# Patient Record
Sex: FEMALE | Race: AMERICAN INDIAN OR ALASKA NATIVE
[De-identification: names, ages, dates, MRNs, and addresses within clinical notes are randomized per-mention and may not be internally consistent; named-entity substitution may affect disease eponyms.]

---

## 2019-02-18 ENCOUNTER — HOSPITAL ENCOUNTER (EMERGENCY)
Dept: HOSPITAL 5 - ED | Age: 54
Discharge: HOME | End: 2019-02-18
Payer: COMMERCIAL

## 2019-02-18 VITALS — SYSTOLIC BLOOD PRESSURE: 148 MMHG | DIASTOLIC BLOOD PRESSURE: 80 MMHG

## 2019-02-18 DIAGNOSIS — K21.9: ICD-10-CM

## 2019-02-18 DIAGNOSIS — M19.90: ICD-10-CM

## 2019-02-18 DIAGNOSIS — M79.602: Primary | ICD-10-CM

## 2019-02-18 DIAGNOSIS — I10: ICD-10-CM

## 2019-02-18 LAB
BASOPHILS # (AUTO): 0 K/MM3 (ref 0–0.1)
BASOPHILS NFR BLD AUTO: 0.8 % (ref 0–1.8)
BUN SERPL-MCNC: 10 MG/DL (ref 7–17)
BUN/CREAT SERPL: 13 %
CALCIUM SERPL-MCNC: 9.1 MG/DL (ref 8.4–10.2)
EOSINOPHIL # BLD AUTO: 0 K/MM3 (ref 0–0.4)
EOSINOPHIL NFR BLD AUTO: 0.9 % (ref 0–4.3)
HCT VFR BLD CALC: 41.2 % (ref 30.3–42.9)
HEMOLYSIS INDEX: 7
HGB BLD-MCNC: 13.8 GM/DL (ref 10.1–14.3)
LYMPHOCYTES # BLD AUTO: 1.8 K/MM3 (ref 1.2–5.4)
LYMPHOCYTES NFR BLD AUTO: 32.3 % (ref 13.4–35)
MCHC RBC AUTO-ENTMCNC: 34 % (ref 30–34)
MCV RBC AUTO: 80 FL (ref 79–97)
MONOCYTES # (AUTO): 0.5 K/MM3 (ref 0–0.8)
MONOCYTES % (AUTO): 8.7 % (ref 0–7.3)
PLATELET # BLD: 276 K/MM3 (ref 140–440)
RBC # BLD AUTO: 5.14 M/MM3 (ref 3.65–5.03)

## 2019-02-18 PROCEDURE — 84484 ASSAY OF TROPONIN QUANT: CPT

## 2019-02-18 PROCEDURE — 93005 ELECTROCARDIOGRAM TRACING: CPT

## 2019-02-18 PROCEDURE — 99284 EMERGENCY DEPT VISIT MOD MDM: CPT

## 2019-02-18 PROCEDURE — 85025 COMPLETE CBC W/AUTO DIFF WBC: CPT

## 2019-02-18 PROCEDURE — 93010 ELECTROCARDIOGRAM REPORT: CPT

## 2019-02-18 PROCEDURE — 80048 BASIC METABOLIC PNL TOTAL CA: CPT

## 2019-02-18 PROCEDURE — 36415 COLL VENOUS BLD VENIPUNCTURE: CPT

## 2019-02-18 PROCEDURE — 71046 X-RAY EXAM CHEST 2 VIEWS: CPT

## 2019-02-18 PROCEDURE — 84703 CHORIONIC GONADOTROPIN ASSAY: CPT

## 2019-02-18 NOTE — EMERGENCY DEPARTMENT REPORT
ED General Adult HPI





- General


Chief complaint: Dyspnea/Respdistress


Stated complaint: (L) ARM PAIN


Time Seen by Provider: 02/18/19 11:30


Source: patient


Mode of arrival: Ambulatory


Limitations: No Limitations





- History of Present Illness


Initial comments: 





Patient is 54-year-old female with history of hypertension.  Patient presented 

to the ER complaining of left arm for one week.  Patient denied any chest pain. 

Patient is also complaining of mild neck pain.  Patient stated that she do a lot

of lifting at work but denied any specific injury.  Patient denied any fever or 

cough, nausea or vomiting.  No weakness numbness or tingling sensation.  No b

owel or bladder incontinence.


Severity scale (0 -10): 3





- Related Data


                                Home Medications











 Medication  Instructions  Recorded  Confirmed  Last Taken


 


Meloxicam 2 tab PO DAILY 06/30/15 06/30/15 06/27/15


 


Omeprazole 40 mg PO DAILY 06/30/15 06/30/15 06/20/15


 


Triamter/Hctz 37.5-25 mg 1 tab PO DAILY 06/30/15 06/30/15 06/20/15








                                  Previous Rx's











 Medication  Instructions  Recorded  Last Taken  Type


 


HYDROcodone/APAP 5-325 [Norco 1 each PO QHS PRN #15 tablet 05/25/15 06/27/15 Rx





5/325]    


 


Naproxen Sodium [Naproxen Sodium 500 mg PO QDAY #30 tbmp.24hr 05/25/15 06/27/15 

Rx





Cr]    











                                    Allergies











Allergy/AdvReac Type Severity Reaction Status Date / Time


 


No Known Allergies Allergy   Verified 06/30/15 08:17














ED Review of Systems


ROS: 


Stated complaint: (L) ARM PAIN


Other details as noted in HPI





Comment: All other systems reviewed and negative


Constitutional: denies: chills, fever


Respiratory: denies: cough, orthopnea, shortness of breath, SOB with exertion, 

SOB at rest, wheezing


Cardiovascular: denies: chest pain, palpitations


Gastrointestinal: denies: abdominal pain, nausea, vomiting, diarrhea, 

constipation, hematemesis, melena, hematochezia


Musculoskeletal: denies: back pain


Neurological: denies: headache, weakness, numbness, paresthesias, confusion





ED Past Medical Hx





- Past Medical History


Previous Medical History?: Yes


Hx Hypertension: Yes (takes meds intermittently)


Hx GERD: Yes


Hx Sickle Cell Disease:  (TRAIT)


Hx Arthritis: Yes





- Surgical History


Past Surgical History?: No





- Social History


Smoking Status: Never Smoker


Substance Use Type: None





- Medications


Home Medications: 


                                Home Medications











 Medication  Instructions  Recorded  Confirmed  Last Taken  Type


 


HYDROcodone/APAP 5-325 [Norco 1 each PO QHS PRN #15 tablet 05/25/15 06/30/15 

06/27/15 Rx





5/325]     


 


Naproxen Sodium [Naproxen Sodium 500 mg PO QDAY #30 tbmp.24hr 05/25/15 06/30/15 

06/27/15 Rx





Cr]     


 


Meloxicam 2 tab PO DAILY 06/30/15 06/30/15 06/27/15 History


 


Omeprazole 40 mg PO DAILY 06/30/15 06/30/15 06/20/15 History


 


Triamter/Hctz 37.5-25 mg 1 tab PO DAILY 06/30/15 06/30/15 06/20/15 History














ED Physical Exam





- General


Limitations: No Limitations


General appearance: alert, in no apparent distress





- Head


Head exam: Present: atraumatic, normocephalic, normal inspection





- Eye


Eye exam: Present: normal appearance, PERRL





- ENT


ENT exam: Present: normal exam, normal orophraynx, mucous membranes moist





- Neck


Neck exam: Present: normal inspection, full ROM.  Absent: tenderness, 

meningismus, lymphadenopathy, thyromegaly





- Respiratory


Respiratory exam: Present: normal lung sounds bilaterally.  Absent: respiratory 

distress, wheezes, rales, rhonchi, chest wall tenderness, accessory muscle use, 

decreased breath sounds, prolonged expiratory





- Cardiovascular


Cardiovascular Exam: Present: regular rate, normal rhythm, normal heart sounds





- GI/Abdominal


GI/Abdominal exam: Present: soft, normal bowel sounds.  Absent: distended, 

tenderness, guarding, rebound, rigid, organomegaly, mass, bruit, pulsatile mass





- Extremities Exam


Extremities exam: Present: normal inspection, full ROM, normal capillary refill.

 Absent: pedal edema, calf tenderness





- Back Exam


Back exam: Present: normal inspection, full ROM.  Absent: tenderness, CVA 

tenderness (R), CVA tenderness (L), muscle spasm, paraspinal tenderness, 

vertebral tenderness





- Neurological Exam


Neurological exam: Present: alert, oriented X3, CN II-XII intact, normal gait, 

reflexes normal





- Skin


Skin exam: Present: warm, intact, normal color





ED Course


                                   Vital Signs











  02/18/19





  10:49


 


Temperature 98.0 F


 


Pulse Rate 99 H


 


Respiratory 16





Rate 


 


Blood Pressure 148/80


 


O2 Sat by Pulse 98





Oximetry 














ED Medical Decision Making





- Lab Data


Result diagrams: 


                                 02/18/19 11:40





                                 02/18/19 11:40





- EKG Data


-: EKG Interpreted by Me


EKG shows normal: sinus rhythm


Rate: tachycardia





- EKG Data


Interpretation: no acute changes





- Radiology Data


Radiology results: report reviewed





Chest x-ray is unremarkable.





- Medical Decision Making





Patient is 54-year-old female with history of hypertension.  Patient presented 

to the ER complaining of left arm for one week.  Patient denied any chest pain. 

Patient is also complaining of mild neck pain.  Patient stated that she do a lot

of lifting at work but denied any specific injury.  Patient denied any fever or 

cough, nausea or vomiting.  No weakness numbness or tingling sensation.  No 

bowel or bladder incontinence.





Patient EKG did not show any ST elevation or depression.  Chest x-ray is 

negative for acute finding.  Troponin is negative.  Believe patient's symptoms 

is most likely musculoskeletal.  I will start patient on Naprosyn and I advised 

the patient to follow-up with her primary care physician as scheduled.


Critical care attestation.: 


If time is entered above; I have spent that time in minutes in the direct care 

of this critically ill patient, excluding procedure time.








ED Disposition


Clinical Impression: 


 Left arm pain





Disposition: DC-01 TO HOME OR SELFCARE


Is pt being admited?: No


Condition: Stable


Instructions:  Arthralgia (ED)


Referrals: 


SANDEE JOHNSTON [Primary Care Provider] - 3-5 Days